# Patient Record
Sex: FEMALE | Race: WHITE | Employment: OTHER | URBAN - METROPOLITAN AREA
[De-identification: names, ages, dates, MRNs, and addresses within clinical notes are randomized per-mention and may not be internally consistent; named-entity substitution may affect disease eponyms.]

---

## 2022-09-17 ENCOUNTER — HOSPITAL ENCOUNTER (EMERGENCY)
Age: 77
Discharge: HOME | End: 2022-09-17
Attending: STUDENT IN AN ORGANIZED HEALTH CARE EDUCATION/TRAINING PROGRAM
Payer: MEDICARE

## 2022-09-17 VITALS
TEMPERATURE: 98.9 F | RESPIRATION RATE: 15 BRPM | HEART RATE: 76 BPM | SYSTOLIC BLOOD PRESSURE: 155 MMHG | OXYGEN SATURATION: 93 % | DIASTOLIC BLOOD PRESSURE: 70 MMHG
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
## 2022-09-17 DIAGNOSIS — R11.2 NON-INTRACTABLE VOMITING WITH NAUSEA, UNSPECIFIED VOMITING TYPE: Primary | ICD-10-CM

## 2022-09-17 LAB
A/G RATIO: 1.6 (ref 1.1–2.2)
ALBUMIN SERPL-MCNC: 3.6 G/DL (ref 3.4–5)
ALP BLD-CCNC: 52 U/L (ref 40–129)
ALT SERPL-CCNC: 16 U/L (ref 10–40)
ANION GAP SERPL CALCULATED.3IONS-SCNC: 10 MMOL/L (ref 3–16)
AST SERPL-CCNC: 17 U/L (ref 15–37)
BASOPHILS ABSOLUTE: 0 K/UL (ref 0–0.2)
BASOPHILS RELATIVE PERCENT: 0.4 %
BILIRUB SERPL-MCNC: 0.3 MG/DL (ref 0–1)
BUN BLDV-MCNC: 13 MG/DL (ref 7–20)
CALCIUM SERPL-MCNC: 7.9 MG/DL (ref 8.3–10.6)
CHLORIDE BLD-SCNC: 105 MMOL/L (ref 99–110)
CO2: 22 MMOL/L (ref 21–32)
CREAT SERPL-MCNC: <0.5 MG/DL (ref 0.6–1.2)
EOSINOPHILS ABSOLUTE: 0.1 K/UL (ref 0–0.6)
EOSINOPHILS RELATIVE PERCENT: 1.2 %
GFR AFRICAN AMERICAN: >60
GFR NON-AFRICAN AMERICAN: >60
GLUCOSE BLD-MCNC: 99 MG/DL (ref 70–99)
HCT VFR BLD CALC: 35.5 % (ref 36–48)
HEMOGLOBIN: 11.8 G/DL (ref 12–16)
LIPASE: 17 U/L (ref 13–60)
LYMPHOCYTES ABSOLUTE: 0.7 K/UL (ref 1–5.1)
LYMPHOCYTES RELATIVE PERCENT: 13.9 %
MCH RBC QN AUTO: 32.2 PG (ref 26–34)
MCHC RBC AUTO-ENTMCNC: 33.3 G/DL (ref 31–36)
MCV RBC AUTO: 96.7 FL (ref 80–100)
MONOCYTES ABSOLUTE: 0.4 K/UL (ref 0–1.3)
MONOCYTES RELATIVE PERCENT: 8.1 %
NEUTROPHILS ABSOLUTE: 4.1 K/UL (ref 1.7–7.7)
NEUTROPHILS RELATIVE PERCENT: 76.4 %
PDW BLD-RTO: 13.5 % (ref 12.4–15.4)
PLATELET # BLD: 292 K/UL (ref 135–450)
PMV BLD AUTO: 8.6 FL (ref 5–10.5)
POTASSIUM REFLEX MAGNESIUM: 3.8 MMOL/L (ref 3.5–5.1)
RBC # BLD: 3.67 M/UL (ref 4–5.2)
SODIUM BLD-SCNC: 137 MMOL/L (ref 136–145)
TOTAL PROTEIN: 5.9 G/DL (ref 6.4–8.2)
WBC # BLD: 5.4 K/UL (ref 4–11)

## 2022-09-17 PROCEDURE — 85025 COMPLETE CBC W/AUTO DIFF WBC: CPT

## 2022-09-17 PROCEDURE — 99284 EMERGENCY DEPT VISIT MOD MDM: CPT

## 2022-09-17 PROCEDURE — 80053 COMPREHEN METABOLIC PANEL: CPT

## 2022-09-17 PROCEDURE — 93005 ELECTROCARDIOGRAM TRACING: CPT | Performed by: STUDENT IN AN ORGANIZED HEALTH CARE EDUCATION/TRAINING PROGRAM

## 2022-09-17 PROCEDURE — 83690 ASSAY OF LIPASE: CPT

## 2022-09-17 ASSESSMENT — PAIN - FUNCTIONAL ASSESSMENT: PAIN_FUNCTIONAL_ASSESSMENT: NONE - DENIES PAIN

## 2022-09-17 NOTE — DISCHARGE INSTRUCTIONS
-You were brought into the emergency department by EMS after having a fall today  -You reported that you were also having nausea and vomiting this morning since before the fall  -Your EKG and electrolytes here were reassuring  -Please call your primary care doctor and follow-up with them within the next several days for repeat assessment of your symptoms  -Return to the ER for new or worsening symptoms including chest pain, trouble breathing, lightheadedness, headache, vision changes, recurrent fall or other symptoms that are concerning to you

## 2022-09-18 LAB
EKG ATRIAL RATE: 73 BPM
EKG DIAGNOSIS: NORMAL
EKG P AXIS: 51 DEGREES
EKG P-R INTERVAL: 158 MS
EKG Q-T INTERVAL: 420 MS
EKG QRS DURATION: 76 MS
EKG QTC CALCULATION (BAZETT): 462 MS
EKG R AXIS: 25 DEGREES
EKG T AXIS: 51 DEGREES
EKG VENTRICULAR RATE: 73 BPM

## 2022-09-20 ASSESSMENT — ENCOUNTER SYMPTOMS
DIARRHEA: 0
NAUSEA: 1
VOMITING: 1
SHORTNESS OF BREATH: 0
SORE THROAT: 0
COUGH: 0
ABDOMINAL PAIN: 0

## 2022-09-20 NOTE — ED PROVIDER NOTES
[address] [facility]          ATTENDING PHYSICIAN NOTE       Date of evaluation: 9/17/2022    Chief Complaint     Fall (From assisted living ( [facility] ); trip and  fall today denies LOC; n/v with squad) and Emesis (Iv zofran given in squad; n/v since resolved)      History of Present Illness     Alicia Small is a 68 y.o. female who presents by EMS after fall from facility. Patient reported that she was feeling nauseous earlier in the day and had several episodes of emesis. She reports that that she then had a mechanical ground-level fall and denies any loss of consciousness, hitting her head or presyncopal symptoms. EMS reports that patient had witnessed episode of emesis with them for which they gave her IV Zofran with improvement of her symptoms. Patient states that she intermittently has episodes of nausea and vomiting that resolved longer at home. Denies any associated fevers, abdominal pain, diarrhea, changes to appetite or other infectious symptoms. Patient denies any headaches, vision changes, neck pain, chest pain or trouble breathing. Denies any pain or injury from the fall. Review of Systems     Review of Systems   Constitutional:  Negative for chills and fever. HENT:  Negative for congestion and sore throat. Eyes:  Negative for visual disturbance. Respiratory:  Negative for cough and shortness of breath. Cardiovascular:  Negative for chest pain. Gastrointestinal:  Positive for nausea and vomiting. Negative for abdominal pain and diarrhea. Genitourinary:  Negative for dysuria and frequency. Musculoskeletal:  Negative for arthralgias, myalgias, neck pain and neck stiffness. Skin:  Negative for rash and wound. Neurological:  Negative for syncope and headaches. Psychiatric/Behavioral:  Negative for confusion. Past Medical, Surgical, Family, and Social History     She has no past medical history on file.   She has no past surgical history on file. Her family history is not on file. She reports that she has never smoked. She has never used smokeless tobacco. She reports that she does not currently use alcohol. Medications     There are no discharge medications for this patient. Allergies     She has No Known Allergies. Physical Exam     INITIAL VITALS: BP: (!) 155/70, Temp: 98.9 °F (37.2 °C), Heart Rate: 76, Resp: 15, SpO2: 93 %   Physical Exam  GENERAL: Awake, alert. No acute distress. Seated comfortably on stretcher  HEENT: Normocephalic, atraumatic. Conjunctiva clear, EOMI, no eye drainage. External ear normal.PERRL. Nares patent with no drainage. Mucous membranes moist. Neck is supple, with full ROM. No c-spine tenderness  CARDIOVASCULAR: RRR, extremities warm and appear well-perfused. No peripheral edema. Strong radial pulses bilaterally  RESPIRATORY: No increased WOB, good air movement, breath sounds CTAB, no wheezes, rhonchi, or crackles noted. Chest: clavicles stable, no chest wall tenderness  GI/ABDOMEN: Soft, non-distended, non-tender, normal bowel sounds, no rebound, no guarding. MSK/EXTR: No deformities or cyanosis noted. Normal ROM  BACK: no midline tenderness, no contusions  SKIN: Warm and dry, no rashes. NEURO: No focal neurologic deficits, moving all four extremities. No gross CN abnormalities. Sensation intact to light touch, ambulates with steady gait  PSYCH: Normal affect, answers questions appropriately. Diagnostic Results     EKG   EKG reviewed interpreted by me shows normal sinus rhythm with rate of 73, narrow QRS, normal axis, no ST elevations or depressions with T wave flattening in lead V2 and artifact in lead III with no prior for comparison.     RADIOLOGY:  No orders to display       LABS:   Results for orders placed or performed during the hospital encounter of 09/17/22   CMP w/ Reflex to MG   Result Value Ref Range    Sodium 137 136 - 145 mmol/L    Potassium reflex Magnesium 3.8 3.5 - 5.1 mmol/L Chloride 105 99 - 110 mmol/L    CO2 22 21 - 32 mmol/L    Anion Gap 10 3 - 16    Glucose 99 70 - 99 mg/dL    BUN 13 7 - 20 mg/dL    Creatinine <0.5 (L) 0.6 - 1.2 mg/dL    GFR Non-African American >60 >60    GFR African American >60 >60    Calcium 7.9 (L) 8.3 - 10.6 mg/dL    Total Protein 5.9 (L) 6.4 - 8.2 g/dL    Albumin 3.6 3.4 - 5.0 g/dL    Albumin/Globulin Ratio 1.6 1.1 - 2.2    Total Bilirubin 0.3 0.0 - 1.0 mg/dL    Alkaline Phosphatase 52 40 - 129 U/L    ALT 16 10 - 40 U/L    AST 17 15 - 37 U/L   CBC with Auto Differential   Result Value Ref Range    WBC 5.4 4.0 - 11.0 K/uL    RBC 3.67 (L) 4.00 - 5.20 M/uL    Hemoglobin 11.8 (L) 12.0 - 16.0 g/dL    Hematocrit 35.5 (L) 36.0 - 48.0 %    MCV 96.7 80.0 - 100.0 fL    MCH 32.2 26.0 - 34.0 pg    MCHC 33.3 31.0 - 36.0 g/dL    RDW 13.5 12.4 - 15.4 %    Platelets 000 604 - 822 K/uL    MPV 8.6 5.0 - 10.5 fL    Neutrophils % 76.4 %    Lymphocytes % 13.9 %    Monocytes % 8.1 %    Eosinophils % 1.2 %    Basophils % 0.4 %    Neutrophils Absolute 4.1 1.7 - 7.7 K/uL    Lymphocytes Absolute 0.7 (L) 1.0 - 5.1 K/uL    Monocytes Absolute 0.4 0.0 - 1.3 K/uL    Eosinophils Absolute 0.1 0.0 - 0.6 K/uL    Basophils Absolute 0.0 0.0 - 0.2 K/uL   Lipase   Result Value Ref Range    Lipase 17.0 13.0 - 60.0 U/L   EKG 12 Lead   Result Value Ref Range    Ventricular Rate 73 BPM    Atrial Rate 73 BPM    P-R Interval 158 ms    QRS Duration 76 ms    Q-T Interval 420 ms    QTc Calculation (Bazett) 462 ms    P Axis 51 degrees    R Axis 25 degrees    T Axis 51 degrees    Diagnosis       EKG performed in ER and to be interpreted by ER physician. Confirmed by MD, ER ([id number]),  [address] [place] [address] [phone]) on 9/18/2022 9:07:37 AM       ED BEDSIDE ULTRASOUND:  No results found.     RECENT VITALS:  BP: (!) 155/70,Temp: 98.9 °F (37.2 °C), Heart Rate: 76, Resp: 15, SpO2: 93 %     Procedures         ED Course     Nursing Notes, Past Medical Hx, Past Surgical Hx, Social Hx,Allergies, and Family Hx were reviewed. ED Course as of 09/20/22 1630   Sat Sep 17, 2022   1507 WBC: 5.4 [ED]   1507 Hemoglobin Quant(!): 11.8 [ED]   1519 Creatinine(!): <0.5 [ED]   1519 Potassium: 3.8 [ED]   1519 Sodium: 137 [ED]      ED Course User Index  [ED] Lissette Hermosillo MD       patient was given the following medications:  No orders of the defined types were placed in this encounter. CONSULTS:  None    MEDICAL DECISIONMAKING / ASSESSMENT / PLAN     Kiana Hernandez is a 68 y.o. female presenting by EMS for fall and vomiting. Patient is well-appearing in no acute distress. Differential concerning for but not limited to cardiac etiology, pancreatitis, hepatitis, cholecystitis, electrolyte abnormality. Patient reports mechanical ground-level fall with no syncope or presyncopal symptoms with no associated chest pain lightheadedness or trouble breathing. EKG nonischemic. Patient reports that she intermittently has episodes of nausea and vomiting that resolved on their own and states that this was present prior to her fall. Denies hitting her head, losing consciousness or being on blood thinners. Labs with normal white blood cell count, mild anemia to 11.8, normal lipase and LFTs. In the emergency department, patient stated that she felt well and wished to go back home. Patient was able to ambulate around the emergency department with steady gait without symptoms. Given the patient was well-appearing with stable vitals, reassuring exam and work-up, she was discharged home with recommendation to follow-up with her primary care doctor and to return to the ER for new or worsening symptoms. Patient expressed understanding and was amenable to plan. All questions were answered prior to discharge. Clinical Impression     1. Non-intractable vomiting with nausea, unspecified vomiting type    2.  Fall from ground level        Disposition     PATIENT REFERRED TO:  Dwaine Friar, MD [id number] [facility] [phone]    Call in 2 days  As needed, If symptoms worsen      DISCHARGE MEDICATIONS:  There are no discharge medications for this patient.       DISPOSITION Decision To Discharge 09/17/2022 03:55:03 PM        Vijay Chase MD  09/20/22 0706

## 2022-11-25 ENCOUNTER — HOSPITAL ENCOUNTER (EMERGENCY)
Age: 77
Discharge: HOME | End: 2022-11-25
Attending: EMERGENCY MEDICINE
Payer: MEDICARE

## 2022-11-25 ENCOUNTER — APPOINTMENT (OUTPATIENT)
Dept: CT IMAGING | Age: 77
End: 2022-11-25
Payer: MEDICARE

## 2022-11-25 ENCOUNTER — APPOINTMENT (OUTPATIENT)
Dept: GENERAL RADIOLOGY | Age: 77
End: 2022-11-25
Payer: MEDICARE

## 2022-11-25 VITALS
HEART RATE: 78 BPM | SYSTOLIC BLOOD PRESSURE: 146 MMHG | RESPIRATION RATE: 18 BRPM | DIASTOLIC BLOOD PRESSURE: 63 MMHG | TEMPERATURE: 98 F | OXYGEN SATURATION: 99 %

## 2022-11-25 DIAGNOSIS — N30.90 CYSTITIS: ICD-10-CM
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
## 2022-11-25 LAB
ANION GAP SERPL CALCULATED.3IONS-SCNC: 12 MMOL/L (ref 3–16)
BACTERIA: ABNORMAL /HPF
BASOPHILS ABSOLUTE: 0 K/UL (ref 0–0.2)
BASOPHILS RELATIVE PERCENT: 0.1 %
BILIRUBIN URINE: NEGATIVE
BLOOD, URINE: NEGATIVE
BUN BLDV-MCNC: 15 MG/DL (ref 7–20)
CALCIUM SERPL-MCNC: 9.4 MG/DL (ref 8.3–10.6)
CHLORIDE BLD-SCNC: 98 MMOL/L (ref 99–110)
CLARITY: CLEAR
CO2: 27 MMOL/L (ref 21–32)
COLOR: YELLOW
CREAT SERPL-MCNC: 0.6 MG/DL (ref 0.6–1.2)
EOSINOPHILS ABSOLUTE: 0 K/UL (ref 0–0.6)
EOSINOPHILS RELATIVE PERCENT: 0.6 %
EPITHELIAL CELLS, UA: ABNORMAL /HPF (ref 0–5)
GFR SERPL CREATININE-BSD FRML MDRD: >60 ML/MIN/{1.73_M2}
GLUCOSE BLD-MCNC: 130 MG/DL (ref 70–99)
GLUCOSE URINE: NEGATIVE MG/DL
HCT VFR BLD CALC: 41.2 % (ref 36–48)
HEMOGLOBIN: 13.7 G/DL (ref 12–16)
KETONES, URINE: ABNORMAL MG/DL
LEUKOCYTE ESTERASE, URINE: ABNORMAL
LYMPHOCYTES ABSOLUTE: 1.2 K/UL (ref 1–5.1)
LYMPHOCYTES RELATIVE PERCENT: 16.2 %
MCH RBC QN AUTO: 32.2 PG (ref 26–34)
MCHC RBC AUTO-ENTMCNC: 33.4 G/DL (ref 31–36)
MCV RBC AUTO: 96.4 FL (ref 80–100)
MICROSCOPIC EXAMINATION: YES
MONOCYTES ABSOLUTE: 0.6 K/UL (ref 0–1.3)
MONOCYTES RELATIVE PERCENT: 8.3 %
NEUTROPHILS ABSOLUTE: 5.3 K/UL (ref 1.7–7.7)
NEUTROPHILS RELATIVE PERCENT: 74.8 %
NITRITE, URINE: NEGATIVE
PDW BLD-RTO: 12.9 % (ref 12.4–15.4)
PH UA: 6.5 (ref 5–8)
PLATELET # BLD: 335 K/UL (ref 135–450)
PMV BLD AUTO: 8.6 FL (ref 5–10.5)
POTASSIUM REFLEX MAGNESIUM: 3.9 MMOL/L (ref 3.5–5.1)
PROTEIN UA: ABNORMAL MG/DL
RBC # BLD: 4.27 M/UL (ref 4–5.2)
RBC UA: ABNORMAL /HPF (ref 0–4)
SODIUM BLD-SCNC: 137 MMOL/L (ref 136–145)
SPECIFIC GRAVITY UA: 1.02 (ref 1–1.03)
TOTAL CK: 384 U/L (ref 26–192)
TROPONIN: <0.01 NG/ML
URINE REFLEX TO CULTURE: YES
URINE TYPE: ABNORMAL
UROBILINOGEN, URINE: 1 E.U./DL
WBC # BLD: 7.1 K/UL (ref 4–11)
WBC UA: ABNORMAL /HPF (ref 0–5)

## 2022-11-25 PROCEDURE — 2580000003 HC RX 258: Performed by: NURSE PRACTITIONER

## 2022-11-25 PROCEDURE — 87086 URINE CULTURE/COLONY COUNT: CPT

## 2022-11-25 PROCEDURE — 96360 HYDRATION IV INFUSION INIT: CPT

## 2022-11-25 PROCEDURE — 85025 COMPLETE CBC W/AUTO DIFF WBC: CPT

## 2022-11-25 PROCEDURE — 87186 SC STD MICRODIL/AGAR DIL: CPT

## 2022-11-25 PROCEDURE — 73562 X-RAY EXAM OF KNEE 3: CPT

## 2022-11-25 PROCEDURE — 51702 INSERT TEMP BLADDER CATH: CPT

## 2022-11-25 PROCEDURE — 84484 ASSAY OF TROPONIN QUANT: CPT

## 2022-11-25 PROCEDURE — 70450 CT HEAD/BRAIN W/O DYE: CPT

## 2022-11-25 PROCEDURE — 81001 URINALYSIS AUTO W/SCOPE: CPT

## 2022-11-25 PROCEDURE — 80048 BASIC METABOLIC PNL TOTAL CA: CPT

## 2022-11-25 PROCEDURE — 99284 EMERGENCY DEPT VISIT MOD MDM: CPT

## 2022-11-25 PROCEDURE — 82550 ASSAY OF CK (CPK): CPT

## 2022-11-25 PROCEDURE — 87088 URINE BACTERIA CULTURE: CPT

## 2022-11-25 PROCEDURE — 36415 COLL VENOUS BLD VENIPUNCTURE: CPT

## 2022-11-25 PROCEDURE — 6370000000 HC RX 637 (ALT 250 FOR IP): Performed by: PHYSICIAN ASSISTANT

## 2022-11-25 RX ORDER — 0.9 % SODIUM CHLORIDE 0.9 %
1000 INTRAVENOUS SOLUTION INTRAVENOUS ONCE
Status: COMPLETED | OUTPATIENT
Start: 2022-11-25 | End: 2022-11-25

## 2022-11-25 RX ORDER — 0.9 % SODIUM CHLORIDE 0.9 %
500 INTRAVENOUS SOLUTION INTRAVENOUS ONCE
Status: DISCONTINUED | OUTPATIENT
Start: 2022-11-25 | End: 2022-11-25

## 2022-11-25 RX ORDER — CEPHALEXIN 500 MG/1
500 CAPSULE ORAL ONCE
Status: COMPLETED | OUTPATIENT
Start: 2022-11-25 | End: 2022-11-25

## 2022-11-25 RX ORDER — CEPHALEXIN 500 MG/1
500 CAPSULE ORAL 4 TIMES DAILY
Qty: 28 CAPSULE | Refills: 0 | Status: SHIPPED | OUTPATIENT
Start: 2022-11-25 | End: 2022-12-02

## 2022-11-25 RX ADMIN — CEPHALEXIN 500 MG: 500 CAPSULE ORAL at 22:37

## 2022-11-25 RX ADMIN — SODIUM CHLORIDE 1000 ML: 0.9 INJECTION, SOLUTION INTRAVENOUS at 16:23

## 2022-11-25 ASSESSMENT — PAIN - FUNCTIONAL ASSESSMENT
PAIN_FUNCTIONAL_ASSESSMENT: NONE - DENIES PAIN
PAIN_FUNCTIONAL_ASSESSMENT: NONE - DENIES PAIN

## 2022-11-25 ASSESSMENT — ENCOUNTER SYMPTOMS
VOMITING: 1
RESPIRATORY NEGATIVE: 1
BLOOD IN STOOL: 0
BACK PAIN: 0
DIARRHEA: 1

## 2022-11-25 NOTE — ED NOTES
Ambulatory up to [facility] in room with staff assistance/supervision. Gait steady.      Collin Gomez RN  11/25/22 9282

## 2022-11-25 NOTE — ED NOTES
Pt resting quietly. Aware of need for UA, does not need to go currently. Arrived in urine/stool from lying on ground all night. Pt cleaned up. Alert and oriented. No acute complaints of pain.       Collin Gomez RN  11/25/22 5211

## 2022-11-25 NOTE — ED PROVIDER NOTES
ED Attending Attestation Note     Date of evaluation: 11/25/2022    This patient was seen by the advance practice provider. I have seen and examined the patient, agree with the workup, evaluation, management and diagnosis. The care plan has been discussed. My assessment reveals:  Physical Exam  Vitals and nursing note reviewed. Constitutional:       General: She is not in acute distress. Appearance: Normal appearance. She is not ill-appearing or toxic-appearing. HENT:      Head: Normocephalic and atraumatic. Mouth/Throat:      Mouth: Mucous membranes are moist.   Eyes:      Extraocular Movements: Extraocular movements intact. Pupils: Pupils are equal, round, and reactive to light. Cardiovascular:      Rate and Rhythm: Normal rate and regular rhythm. Pulmonary:      Effort: Pulmonary effort is normal.   Musculoskeletal:         General: No deformity. Cervical back: Normal range of motion. Comments: Bilateral hand deformities with missing digits   Skin:     General: Skin is warm and dry. Neurological:      Mental Status: She is alert. She describes a mechanical slip and fall last night. She was not able to reach her alert button, which was on the bookcase. She denies any injuries. She states that she will wear her alert button consistently moving forward.      Charna Prader, MD  11/25/22 2098

## 2022-11-25 NOTE — ED PROVIDER NOTES
[address] [facility] ENCOUNTER          NURSE PRACTITIONER NOTE       Date of evaluation: 11/25/2022    Chief Complaint     Fall (Tripped over blanket on ground and fell last night around 9pm, laid on ground all night. )      History of Present Illness     Elvia Milan is a 68 y.o. female past medical history of IBS, depression, prediabetes, obesity, hypertension, repeated falls; who presents to the emergency department with a complaint of a fall last night, and inability to get up off the ground. Patient reports that she tripped over a blanket that was on the ground when she was watching TV last night, and fell at approximately 9 PM.  She states that she has bad knees at baseline, was unable to get up due to her knee pain, and laid on the ground all night. She periodically slept, was able to show me over to her phone this morning when it was raining. States several people called her throughout the night however she is unable to get to her phone. All of her siblings live out of state, and states that one of them called for a well check on her due to her not answering the phone. On EMS arrival, patient noted to have vomited, and soiled herself with urine and stool, and she was unable to get up independently. Patient does state that she has a life alert button, however this was on a bookshelf out of reach. Patient did have some positional dizziness upon arrival here, otherwise denies dizziness when lying still, or any headaches. Denies feelings of nausea currently, abdominal pain, chest pain, shortness of breath, recent illnesses. States that she lives alone, is able to take care of herself at home independently, uses a walker when she is out and about, sometimes uses a cane at home. Review of Systems     Review of Systems   Constitutional:  Positive for fatigue. Respiratory: Negative. Cardiovascular: Negative. Gastrointestinal:  Positive for diarrhea and vomiting.  Negative for blood in stool. Musculoskeletal:  Positive for arthralgias (bilateral knee pain). Negative for back pain and neck pain. Skin: Negative. Allergic/Immunologic: Negative for immunocompromised state. Neurological:  Negative for dizziness, weakness, light-headedness, numbness and headaches. Psychiatric/Behavioral: Negative. Past Medical, Surgical, Family, and Social History     She has no past medical history on file. She has no past surgical history on file. Her family history is not on file. She reports that she has never smoked. She has never used smokeless tobacco. She reports that she does not currently use alcohol. Medications     Previous Medications    No medications on file       Allergies     She has No Known Allergies. Physical Exam     INITIAL VITALS: BP: (!) 155/55, Temp: 97.7 °F (36.5 °C), Heart Rate: 78, Resp: 19, SpO2: 98 %   Physical Exam  Vitals and nursing note reviewed. Constitutional:       Appearance: Normal appearance. HENT:      Head: Normocephalic and atraumatic. Cardiovascular:      Rate and Rhythm: Normal rate and regular rhythm. Pulses: Normal pulses. Heart sounds: Normal heart sounds. Abdominal:      General: Bowel sounds are normal. There is no distension. Palpations: Abdomen is soft. There is no mass. Tenderness: There is no abdominal tenderness. There is no guarding or rebound. Hernia: No hernia is present. Musculoskeletal:         General: Normal range of motion. Cervical back: Normal range of motion and neck supple. Comments: Able to fully range knees with pain, TTP to bilateral anterior knee joints without ligamentous instability. Mild bruising of the left anterior knee, no edema, warmth or erythema noted. Soft compartments throughout legs and arm. No TTP of pelvis   Skin:     General: Skin is warm and dry. Capillary Refill: Capillary refill takes less than 2 seconds.    Neurological:      Mental Status: She is alert and oriented to person, place, and time. Psychiatric:         Mood and Affect: Mood normal.         Behavior: Behavior normal.         Diagnostic Results     RADIOLOGY:  CT HEAD WO CONTRAST   Final Result   Impression:       No acute intracranial abnormality. XR KNEE RIGHT (3 VIEWS)   Final Result   Impression:       No acute osseous abnormality of either knee. Mild degenerative arthropathy of the right greater than left knee. XR KNEE LEFT (3 VIEWS)   Final Result   Impression:       No acute osseous abnormality of either knee. Mild degenerative arthropathy of the right greater than left knee.           LABS:   Results for orders placed or performed during the hospital encounter of 11/25/22   BMP w/ Reflex to MG   Result Value Ref Range    Sodium 137 136 - 145 mmol/L    Potassium reflex Magnesium 3.9 3.5 - 5.1 mmol/L    Chloride 98 (L) 99 - 110 mmol/L    CO2 27 21 - 32 mmol/L    Anion Gap 12 3 - 16    Glucose 130 (H) 70 - 99 mg/dL    BUN 15 7 - 20 mg/dL    Creatinine 0.6 0.6 - 1.2 mg/dL    Est, Glom Filt Rate >60 >60    Calcium 9.4 8.3 - 10.6 mg/dL   CBC with Auto Differential   Result Value Ref Range    WBC 7.1 4.0 - 11.0 K/uL    RBC 4.27 4.00 - 5.20 M/uL    Hemoglobin 13.7 12.0 - 16.0 g/dL    Hematocrit 41.2 36.0 - 48.0 %    MCV 96.4 80.0 - 100.0 fL    MCH 32.2 26.0 - 34.0 pg    MCHC 33.4 31.0 - 36.0 g/dL    RDW 12.9 12.4 - 15.4 %    Platelets 745 128 - 868 K/uL    MPV 8.6 5.0 - 10.5 fL    Neutrophils % 74.8 %    Lymphocytes % 16.2 %    Monocytes % 8.3 %    Eosinophils % 0.6 %    Basophils % 0.1 %    Neutrophils Absolute 5.3 1.7 - 7.7 K/uL    Lymphocytes Absolute 1.2 1.0 - 5.1 K/uL    Monocytes Absolute 0.6 0.0 - 1.3 K/uL    Eosinophils Absolute 0.0 0.0 - 0.6 K/uL    Basophils Absolute 0.0 0.0 - 0.2 K/uL   Troponin   Result Value Ref Range    Troponin <0.01 <0.01 ng/mL   CK   Result Value Ref Range    Total  (H) 26 - 192 U/L         RECENT VITALS:  BP: (!) 122/53, Temp: 97.7 °F (36.5 °C), Heart Rate: 75, Resp: 14, SpO2: 98 %       ED Course     Nursing Notes, Past Medical Hx, Past Surgical Hx, Social Hx, Allergies, and Family Hx were reviewed. The patient was given the following medications:  Orders Placed This Encounter   Medications    DISCONTD: 0.9 % sodium chloride bolus    0.9 % sodium chloride bolus            CONSULTS:  None    MEDICAL DECISION MAKING / ASSESSMENT / PLAN     Nazanin Sanches is a 68 y.o. female who presents with complaints as noted in HPI. Patient presents to the emergency department status post mechanical fall at home. Patient fell last evening at around 9 PM, states that she tripped on a throw blanket that was on the ground. Unfortunate was unable to get up due to ongoing pain in her knees which she states is typical for her. She tried several times, cannot reach her phone or get herself up off the ground so spent the night on the ground. Was able to get to her telephone this morning, and one of her family members called 911 to check on her. On arrival she was found to be soiled, and had emesis covering her as well as urine and stool. On my evaluation patient denies any presyncopal symptoms prior to her fall, states it was simply a tripping incident. She denies any current chest pain, shortness of breath, abdominal pain, nausea vomiting, headaches or vision changes. Did have some mild dizziness with position change here, denies any at rest.  She remembers the fall however cannot recall if she hit her head. Patient an IV placed the appropriate labwork was drawn including a CBC, BMP, troponin, CK, urinalysis. CK mildly elevated at 384, hydrated with a liter of IV fluids  BMP with a glucose of 130, BUN 15, creatinine 0.6  CBC without leukocytosis or anemia  Urinalysis pending    X-rays of patient's bilateral knees and head CT were obtained for further evaluation of injuries from the fall.   No acute osseous abnormality noted of either knee  Head CT without acute intracranial abnormality. Patient will be turned over primarily to my colleague for follow-up on urinalysis results, ambulation and ultimate disposition. This patient was also evaluated by the attending physician. All care plans were discussed and agreed upon. Clinical Impression     1. Fall, initial encounter    2.  Cystitis        Disposition       DISPOSITION  pending        JOSE ALFREDO Orta - CNP  11/26/22 1110

## 2022-11-26 NOTE — ED NOTES
Pt discharged to home. Discharge instructions including 1 prescription reviewed with patient. All questions answered, no concerns noted. Pt encouraged to return to emergency department if they have any new or worsening symptoms. Pt alert and oriented, resp even and unlabored, skin natural in color, no signs of acute distress.           Ирина Martinez RN  11/25/22 5067

## 2022-11-26 NOTE — ED PROVIDER NOTES
ADDENDUM:      Care of this patient was assumed from [facility]. The patient was seen for Fall (Tripped over blanket on ground and fell last night around 9pm, laid on ground all night. )  . The patient's initial evaluation and plan have been discussed with the prior provider who initially evaluated the patient. Nursing Notes, Past Medical Hx, Past Surgical Hx, Social Hx, Allergies, and Family Hx were all reviewed. Diagnostic Results     EKG   Not performed    RADIOLOGY:  CT HEAD WO CONTRAST   Final Result   Impression:       No acute intracranial abnormality. XR KNEE RIGHT (3 VIEWS)   Final Result   Impression:       No acute osseous abnormality of either knee. Mild degenerative arthropathy of the right greater than left knee. XR KNEE LEFT (3 VIEWS)   Final Result   Impression:       No acute osseous abnormality of either knee. Mild degenerative arthropathy of the right greater than left knee. LABS:   Labs Reviewed   BASIC METABOLIC PANEL W/ REFLEX TO MG FOR LOW K - Abnormal; Notable for the following components:       Result Value    Chloride 98 (*)     Glucose 130 (*)     All other components within normal limits   CK - Abnormal; Notable for the following components: Total  (*)     All other components within normal limits   CBC WITH AUTO DIFFERENTIAL   TROPONIN   URINALYSIS WITH REFLEX TO CULTURE       RECENT VITALS:  BP: (!) 129/51, Temp: 97.7 °F (36.5 °C), Heart Rate: 76, Resp: 15     Procedures       ED Course     The patient was given the following medications:  Orders Placed This Encounter   Medications    DISCONTD: 0.9 % sodium chloride bolus    0.9 % sodium chloride bolus         CONSULTS:  None    MEDICAL DECISION MAKING     Chuy Wright is a 68 y.o. female who presented after a fall. She had a medical screening workup with imaging performed and she was turned over to me pending a uA and ambulation test.  The patient ambulated without difficulty.   Was unable to provide us with a urine sample and after multiple requests by staff she decided that she would like to leave stating that she can urinate when she gets home and she does not have to go right now and does not want a wait. I explained to her that we want to perform a urinalysis to check for an occult urinary tract infection that may explain her fall or weakness. She denies dysuria hematuria foul odor or discharge, pelvic pain, fevers chills, confusion. She had understands that we want to check the urine for the specific reasons but she has no concerns that she has an active UTI. She was comfortable being discharged without knowing that information and we encouraged her to follow-up with primary care. Prior to discharge she was agreeable to giving us a urine sample, which may show a mild UTI. Will send for cx, and start Keflex. Patient was able to prove she could ambulate again, and seems appropriate for discharge. Will continue the plan as above. This patient was also evaluated by the attending physician. All care plans were discussed and agreed upon. Clinical Impression     1. Fall, initial encounter        Disposition/Plan     PATIENT REFERRED TO:  MD [id number]  [address]  [phone]    Call   For ED follow up appointment      DISCHARGE MEDICATIONS:  New Prescriptions    No medications on file       DISPOSITION Decision To Discharge 11/25/2022 08:17:07 PM      (Please note that portions of this note were completed with voice recognition software.   Efforts were made to edit the dictations but occasionally words are mis-transcribed.)         Mindy Carbone, [id number]  11/25/22 1411 Lasha Kohler, [address]  11/25/22 8727

## 2022-11-26 NOTE — ED NOTES
Patient not able to void using purewick, bedpan or BSC. Bladder scan showing 267ml. Pt states she does not wish to be cathed for urine. Explained that UA is only missing piece of ER work up. Pt respectfully wishes to not have catheter for urine sample. Relayed to maria esther Garcia RN  11/25/22 3090

## 2022-11-28 LAB
ORGANISM: ABNORMAL
URINE CULTURE, ROUTINE: ABNORMAL

## 2024-04-20 ENCOUNTER — APPOINTMENT (OUTPATIENT)
Dept: CT IMAGING | Age: 79
End: 2024-04-20
Payer: MEDICARE

## 2024-04-20 ENCOUNTER — APPOINTMENT (OUTPATIENT)
Dept: GENERAL RADIOLOGY | Age: 79
End: 2024-04-20
Payer: MEDICARE

## 2024-04-20 ENCOUNTER — HOSPITAL ENCOUNTER (EMERGENCY)
Age: 79
Discharge: HOME | End: 2024-04-20
Attending: STUDENT IN AN ORGANIZED HEALTH CARE EDUCATION/TRAINING PROGRAM
Payer: MEDICARE

## 2024-04-20 VITALS
RESPIRATION RATE: 21 BRPM | DIASTOLIC BLOOD PRESSURE: 78 MMHG | SYSTOLIC BLOOD PRESSURE: 109 MMHG | WEIGHT: 189.3 LBS | HEART RATE: 73 BPM | OXYGEN SATURATION: 94 %
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
## 2024-04-20 DIAGNOSIS — S01.511A LIP LACERATION, INITIAL ENCOUNTER: ICD-10-CM

## 2024-04-20 PROCEDURE — 70450 CT HEAD/BRAIN W/O DYE: CPT

## 2024-04-20 PROCEDURE — 12011 RPR F/E/E/N/L/M 2.5 CM/<: CPT

## 2024-04-20 PROCEDURE — 70486 CT MAXILLOFACIAL W/O DYE: CPT

## 2024-04-20 PROCEDURE — 99284 EMERGENCY DEPT VISIT MOD MDM: CPT

## 2024-04-20 PROCEDURE — 72125 CT NECK SPINE W/O DYE: CPT

## 2024-04-20 PROCEDURE — 73560 X-RAY EXAM OF KNEE 1 OR 2: CPT

## 2024-04-20 PROCEDURE — 2500000003 HC RX 250 WO HCPCS

## 2024-04-20 RX ORDER — LIDOCAINE HYDROCHLORIDE AND EPINEPHRINE 10; 10 MG/ML; UG/ML
20 INJECTION, SOLUTION INFILTRATION; PERINEURAL ONCE
Status: COMPLETED | OUTPATIENT
Start: 2024-04-20 | End: 2024-04-20

## 2024-04-20 RX ADMIN — LIDOCAINE HYDROCHLORIDE,EPINEPHRINE BITARTRATE 20 ML: 10; .01 INJECTION, SOLUTION INFILTRATION; PERINEURAL at 13:23

## 2024-04-20 ASSESSMENT — PAIN - FUNCTIONAL ASSESSMENT: PAIN_FUNCTIONAL_ASSESSMENT: NONE - DENIES PAIN

## 2024-04-20 NOTE — ED PROVIDER NOTES
ED Attending Attestation Note     Date of evaluation: 4/20/2024    I have discussed the case with the resident. I have personally performed a history, physical exam, and my own medical decision making. I have reviewed the note and agree with the findings and plan. My assessment reveals a 78-year-old female with facial trauma after mechanical fall.  CT scan of the head, cervical spine, and facial bones were performed and did not show any acute bony or intracranial abnormality.  The patient's lip laceration was repaired, please see procedure note for further details.  I was present for all critical portions of the procedure performed by the resident.  Patient discharged home with strict return precautions and instruction to follow-up with her PCP in the coming 3 days.       Kt Jacobs MD  04/20/24 5928

## 2024-04-20 NOTE — ED PROVIDER NOTES
THE [facility]  EMERGENCY DEPARTMENT ENCOUNTER          EM RESIDENT NOTE       Date of evaluation: 4/20/2024    Chief Complaint     Fall      History of Present Illness     Dalila Eddy is a 78 y.o. female who presents s/p fall.    Patient states she was walking down an incline to her car when she had a mechanical fall.  She states that she tripped going down the incline.  Denies any preceding symptoms including chest pain, dizziness, palpitations.  No LOC, patient is not on anticoagulation.  Patient denies any current pain although has visible trauma with dried blood to her face.  Denies chest pain, shortness of breath, abdominal pain, blurred vision, N/V, headache.  Tetanus up-to-date.    MEDICAL DECISION MAKING / ASSESSMENT / PLAN     INITIAL VITALS: BP: (!) 146/61,  , Pulse: 73, Respirations: 21, SpO2: 94 %    Dalila Edyd is a 78 y.o. female presenting after a mechanical fall.      On presentation, patient is resting comfortably and in no acute distress.  Vital signs are unremarkable, patient SBP is in the 140s, she is nontachycardic, she is satting mid 90s on room air with no increased work of breathing.  On exam, patient has dried blood to the right side of her face with a 1 cm laceration to her right lower lip that does not cross the milium border, as well as swelling to her right upper lip.  There is also an abrasion above her right eyebrow.  She has no trismus or malocclusion.  No C/T/L-spine tenderness, however will maintain spinal precautions with a c-collar in place.  She has an abrasion to her right knee with some tenderness to palpation, however patient states that she has arthritis in this knee and that this pain is not new.  However, will obtain x-ray of the right knee to further evaluate.  Will also obtain CT of the head, C-spine, as well as facial bones.  Patient is at greater risk of injury given her age and has visible trauma to her face.  No concerning abdominal findings that would

## 2024-04-20 NOTE — DISCHARGE INSTRUCTIONS
You were seen in the [facility] emergency department after a fall.  You had a large cut on your lower lip that was repaired with absorbable stitches.  The stitches do not need to be removed and will fall out on their own.  Please return to the emergency department for any new or worsening symptoms or if you feel that you need to be evaluated by a physician.

## 2024-11-16 ENCOUNTER — APPOINTMENT (OUTPATIENT)
Dept: CT IMAGING | Age: 79
DRG: 948 | End: 2024-11-16
Payer: COMMERCIAL

## 2024-11-16 ENCOUNTER — HOSPITAL ENCOUNTER (INPATIENT)
Age: 79
LOS: 1 days | Discharge: HOME HEALTH | DRG: 948 | End: 2024-11-18
Attending: STUDENT IN AN ORGANIZED HEALTH CARE EDUCATION/TRAINING PROGRAM | Admitting: INTERNAL MEDICINE
Payer: COMMERCIAL

## 2024-11-16 DIAGNOSIS — R29.6 MULTIPLE FALLS: ICD-10-CM

## 2024-11-16 DIAGNOSIS — R74.8 ELEVATED CK: Primary | ICD-10-CM

## 2024-11-16 LAB
ALBUMIN SERPL-MCNC: 4.2 G/DL (ref 3.4–5)
ALBUMIN/GLOB SERPL: 1.3 {RATIO} (ref 1.1–2.2)
ALP SERPL-CCNC: 55 U/L (ref 40–129)
ALT SERPL-CCNC: 30 U/L (ref 10–40)
ANION GAP SERPL CALCULATED.3IONS-SCNC: 13 MMOL/L (ref 3–16)
AST SERPL-CCNC: 60 U/L (ref 15–37)
BASOPHILS # BLD: 0 K/UL (ref 0–0.2)
BASOPHILS NFR BLD: 0.2 %
BILIRUB SERPL-MCNC: 0.6 MG/DL (ref 0–1)
BUN SERPL-MCNC: 17 MG/DL (ref 7–20)
CALCIUM SERPL-MCNC: 8.6 MG/DL (ref 8.3–10.6)
CHLORIDE SERPL-SCNC: 100 MMOL/L (ref 99–110)
CK SERPL-CCNC: 1432 U/L (ref 26–192)
CO2 SERPL-SCNC: 24 MMOL/L (ref 21–32)
CREAT SERPL-MCNC: 0.7 MG/DL (ref 0.6–1.2)
DEPRECATED RDW RBC AUTO: 13.1 % (ref 12.4–15.4)
EOSINOPHIL # BLD: 0.5 K/UL (ref 0–0.6)
EOSINOPHIL NFR BLD: 6 %
GFR SERPLBLD CREATININE-BSD FMLA CKD-EPI: 88 ML/MIN/{1.73_M2}
GLUCOSE SERPL-MCNC: 110 MG/DL (ref 70–99)
HCT VFR BLD AUTO: 35.9 % (ref 36–48)
HGB BLD-MCNC: 12.2 G/DL (ref 12–16)
LYMPHOCYTES # BLD: 1.2 K/UL (ref 1–5.1)
LYMPHOCYTES NFR BLD: 13.1 %
MCH RBC QN AUTO: 32.2 PG (ref 26–34)
MCHC RBC AUTO-ENTMCNC: 34 G/DL (ref 31–36)
MCV RBC AUTO: 94.8 FL (ref 80–100)
MONOCYTES # BLD: 0.7 K/UL (ref 0–1.3)
MONOCYTES NFR BLD: 8.1 %
NEUTROPHILS # BLD: 6.5 K/UL (ref 1.7–7.7)
NEUTROPHILS NFR BLD: 72.6 %
PLATELET # BLD AUTO: 339 K/UL (ref 135–450)
PMV BLD AUTO: 8.5 FL (ref 5–10.5)
POTASSIUM SERPL-SCNC: 3.8 MMOL/L (ref 3.5–5.1)
PROT SERPL-MCNC: 7.4 G/DL (ref 6.4–8.2)
RBC # BLD AUTO: 3.79 M/UL (ref 4–5.2)
SODIUM SERPL-SCNC: 137 MMOL/L (ref 136–145)
WBC # BLD AUTO: 9 K/UL (ref 4–11)

## 2024-11-16 PROCEDURE — 6360000004 HC RX CONTRAST MEDICATION

## 2024-11-16 PROCEDURE — 93005 ELECTROCARDIOGRAM TRACING: CPT

## 2024-11-16 PROCEDURE — 74177 CT ABD & PELVIS W/CONTRAST: CPT

## 2024-11-16 PROCEDURE — 71260 CT THORAX DX C+: CPT

## 2024-11-16 PROCEDURE — 76376 3D RENDER W/INTRP POSTPROCES: CPT

## 2024-11-16 PROCEDURE — 72125 CT NECK SPINE W/O DYE: CPT

## 2024-11-16 PROCEDURE — 70450 CT HEAD/BRAIN W/O DYE: CPT

## 2024-11-16 PROCEDURE — 99285 EMERGENCY DEPT VISIT HI MDM: CPT

## 2024-11-16 PROCEDURE — 82550 ASSAY OF CK (CPK): CPT

## 2024-11-16 PROCEDURE — 80053 COMPREHEN METABOLIC PANEL: CPT

## 2024-11-16 PROCEDURE — 85025 COMPLETE CBC W/AUTO DIFF WBC: CPT

## 2024-11-16 RX ORDER — IOPAMIDOL 755 MG/ML
75 INJECTION, SOLUTION INTRAVASCULAR
Status: COMPLETED | OUTPATIENT
Start: 2024-11-16 | End: 2024-11-16

## 2024-11-16 RX ADMIN — IOPAMIDOL 75 ML: 755 INJECTION, SOLUTION INTRAVENOUS at 23:30

## 2024-11-16 ASSESSMENT — PAIN SCALES - GENERAL: PAINLEVEL_OUTOF10: 8

## 2024-11-16 ASSESSMENT — LIFESTYLE VARIABLES
HOW OFTEN DO YOU HAVE A DRINK CONTAINING ALCOHOL: NEVER
HOW MANY STANDARD DRINKS CONTAINING ALCOHOL DO YOU HAVE ON A TYPICAL DAY: PATIENT DOES NOT DRINK
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
## 2024-11-17 LAB
ANION GAP SERPL CALCULATED.3IONS-SCNC: 12 MMOL/L (ref 3–16)
BILIRUB UR QL STRIP.AUTO: ABNORMAL
BUN SERPL-MCNC: 18 MG/DL (ref 7–20)
CALCIUM SERPL-MCNC: 8.2 MG/DL (ref 8.3–10.6)
CHLORIDE SERPL-SCNC: 103 MMOL/L (ref 99–110)
CK SERPL-CCNC: 1047 U/L (ref 26–192)
CK SERPL-CCNC: 702 U/L (ref 26–192)
CLARITY UR: CLEAR
CO2 SERPL-SCNC: 22 MMOL/L (ref 21–32)
COLOR UR: YELLOW
CREAT SERPL-MCNC: 0.7 MG/DL (ref 0.6–1.2)
GFR SERPLBLD CREATININE-BSD FMLA CKD-EPI: 88 ML/MIN/{1.73_M2}
GLUCOSE SERPL-MCNC: 113 MG/DL (ref 70–99)
GLUCOSE UR STRIP.AUTO-MCNC: NEGATIVE MG/DL
HGB UR QL STRIP.AUTO: NEGATIVE
KETONES UR STRIP.AUTO-MCNC: 15 MG/DL
LEUKOCYTE ESTERASE UR QL STRIP.AUTO: NEGATIVE
NITRITE UR QL STRIP.AUTO: NEGATIVE
PH UR STRIP.AUTO: 6 [PH] (ref 5–8)
POTASSIUM SERPL-SCNC: 3.8 MMOL/L (ref 3.5–5.1)
PROT UR STRIP.AUTO-MCNC: ABNORMAL MG/DL
RBC #/AREA URNS HPF: NORMAL /HPF (ref 0–4)
SODIUM SERPL-SCNC: 137 MMOL/L (ref 136–145)
SP GR UR STRIP.AUTO: 1.02 (ref 1–1.03)
UA COMPLETE W REFLEX CULTURE PNL UR: ABNORMAL
UA DIPSTICK W REFLEX MICRO PNL UR: YES
URN SPEC COLLECT METH UR: ABNORMAL
UROBILINOGEN UR STRIP-ACNC: 0.2 E.U./DL
WBC #/AREA URNS HPF: NORMAL /HPF (ref 0–5)

## 2024-11-17 PROCEDURE — 81001 URINALYSIS AUTO W/SCOPE: CPT

## 2024-11-17 PROCEDURE — 36415 COLL VENOUS BLD VENIPUNCTURE: CPT

## 2024-11-17 PROCEDURE — 2580000003 HC RX 258

## 2024-11-17 PROCEDURE — 51701 INSERT BLADDER CATHETER: CPT

## 2024-11-17 PROCEDURE — G0378 HOSPITAL OBSERVATION PER HR: HCPCS

## 2024-11-17 PROCEDURE — 51798 US URINE CAPACITY MEASURE: CPT

## 2024-11-17 PROCEDURE — 2580000003 HC RX 258: Performed by: INTERNAL MEDICINE

## 2024-11-17 PROCEDURE — 1200000000 HC SEMI PRIVATE

## 2024-11-17 PROCEDURE — 96360 HYDRATION IV INFUSION INIT: CPT

## 2024-11-17 PROCEDURE — 82550 ASSAY OF CK (CPK): CPT

## 2024-11-17 PROCEDURE — 6370000000 HC RX 637 (ALT 250 FOR IP): Performed by: INTERNAL MEDICINE

## 2024-11-17 PROCEDURE — 80048 BASIC METABOLIC PNL TOTAL CA: CPT

## 2024-11-17 RX ORDER — SODIUM CHLORIDE 9 MG/ML
INJECTION, SOLUTION INTRAVENOUS PRN
Status: DISCONTINUED | OUTPATIENT
Start: 2024-11-17 | End: 2024-11-18 | Stop reason: HOSPADM

## 2024-11-17 RX ORDER — BUPROPION HYDROCHLORIDE 150 MG/1
150 TABLET ORAL EVERY MORNING
COMMUNITY

## 2024-11-17 RX ORDER — SODIUM CHLORIDE 0.9 % (FLUSH) 0.9 %
5-40 SYRINGE (ML) INJECTION EVERY 12 HOURS SCHEDULED
Status: DISCONTINUED | OUTPATIENT
Start: 2024-11-17 | End: 2024-11-18 | Stop reason: HOSPADM

## 2024-11-17 RX ORDER — ACETAMINOPHEN 325 MG/1
650 TABLET ORAL EVERY 6 HOURS PRN
Status: DISCONTINUED | OUTPATIENT
Start: 2024-11-17 | End: 2024-11-18 | Stop reason: HOSPADM

## 2024-11-17 RX ORDER — ACETAMINOPHEN 325 MG/1
650 TABLET ORAL EVERY 6 HOURS PRN
COMMUNITY

## 2024-11-17 RX ORDER — ONDANSETRON 2 MG/ML
4 INJECTION INTRAMUSCULAR; INTRAVENOUS EVERY 6 HOURS PRN
Status: DISCONTINUED | OUTPATIENT
Start: 2024-11-17 | End: 2024-11-18 | Stop reason: HOSPADM

## 2024-11-17 RX ORDER — ALBUTEROL SULFATE 90 UG/1
2 INHALANT RESPIRATORY (INHALATION) EVERY 6 HOURS PRN
COMMUNITY

## 2024-11-17 RX ORDER — FLUOXETINE 40 MG/1
40 CAPSULE ORAL DAILY
COMMUNITY

## 2024-11-17 RX ORDER — OXYCODONE HYDROCHLORIDE 5 MG/1
5 TABLET ORAL EVERY 4 HOURS PRN
Status: DISCONTINUED | OUTPATIENT
Start: 2024-11-17 | End: 2024-11-18 | Stop reason: HOSPADM

## 2024-11-17 RX ORDER — 0.9 % SODIUM CHLORIDE 0.9 %
1000 INTRAVENOUS SOLUTION INTRAVENOUS ONCE
Status: COMPLETED | OUTPATIENT
Start: 2024-11-17 | End: 2024-11-17

## 2024-11-17 RX ORDER — SODIUM CHLORIDE 0.9 % (FLUSH) 0.9 %
5-40 SYRINGE (ML) INJECTION PRN
Status: DISCONTINUED | OUTPATIENT
Start: 2024-11-17 | End: 2024-11-18 | Stop reason: HOSPADM

## 2024-11-17 RX ORDER — METFORMIN HYDROCHLORIDE 500 MG/1
500 TABLET, EXTENDED RELEASE ORAL
COMMUNITY

## 2024-11-17 RX ORDER — ACETAMINOPHEN 650 MG/1
650 SUPPOSITORY RECTAL EVERY 6 HOURS PRN
Status: DISCONTINUED | OUTPATIENT
Start: 2024-11-17 | End: 2024-11-18 | Stop reason: HOSPADM

## 2024-11-17 RX ORDER — MAGNESIUM SULFATE IN WATER 40 MG/ML
2000 INJECTION, SOLUTION INTRAVENOUS PRN
Status: DISCONTINUED | OUTPATIENT
Start: 2024-11-17 | End: 2024-11-18 | Stop reason: HOSPADM

## 2024-11-17 RX ORDER — SODIUM CHLORIDE 9 MG/ML
INJECTION, SOLUTION INTRAVENOUS CONTINUOUS
Status: ACTIVE | OUTPATIENT
Start: 2024-11-17 | End: 2024-11-18

## 2024-11-17 RX ORDER — POLYETHYLENE GLYCOL 3350 17 G/17G
17 POWDER, FOR SOLUTION ORAL DAILY PRN
Status: DISCONTINUED | OUTPATIENT
Start: 2024-11-17 | End: 2024-11-18 | Stop reason: HOSPADM

## 2024-11-17 RX ORDER — ACETAMINOPHEN 160 MG
2000 TABLET,DISINTEGRATING ORAL DAILY
COMMUNITY

## 2024-11-17 RX ORDER — ONDANSETRON 4 MG/1
4 TABLET, ORALLY DISINTEGRATING ORAL EVERY 8 HOURS PRN
Status: DISCONTINUED | OUTPATIENT
Start: 2024-11-17 | End: 2024-11-18 | Stop reason: HOSPADM

## 2024-11-17 RX ORDER — POTASSIUM CHLORIDE 7.45 MG/ML
10 INJECTION INTRAVENOUS PRN
Status: DISCONTINUED | OUTPATIENT
Start: 2024-11-17 | End: 2024-11-18 | Stop reason: HOSPADM

## 2024-11-17 RX ORDER — AMLODIPINE BESYLATE 5 MG/1
5 TABLET ORAL DAILY
COMMUNITY

## 2024-11-17 RX ORDER — POTASSIUM CHLORIDE 1500 MG/1
40 TABLET, EXTENDED RELEASE ORAL PRN
Status: DISCONTINUED | OUTPATIENT
Start: 2024-11-17 | End: 2024-11-18 | Stop reason: HOSPADM

## 2024-11-17 RX ORDER — OXYCODONE HYDROCHLORIDE 5 MG/1
10 TABLET ORAL EVERY 4 HOURS PRN
Status: DISCONTINUED | OUTPATIENT
Start: 2024-11-17 | End: 2024-11-18 | Stop reason: HOSPADM

## 2024-11-17 RX ORDER — FAMOTIDINE 20 MG/1
20 TABLET, FILM COATED ORAL 2 TIMES DAILY PRN
COMMUNITY

## 2024-11-17 RX ADMIN — SODIUM CHLORIDE 1000 ML: 0.9 INJECTION, SOLUTION INTRAVENOUS at 03:00

## 2024-11-17 RX ADMIN — POLYETHYLENE GLYCOL 3350 17 G: 17 POWDER, FOR SOLUTION ORAL at 10:35

## 2024-11-17 RX ADMIN — SODIUM CHLORIDE: 9 INJECTION, SOLUTION INTRAVENOUS at 03:55

## 2024-11-17 RX ADMIN — SODIUM CHLORIDE: 9 INJECTION, SOLUTION INTRAVENOUS at 13:50

## 2024-11-17 RX ADMIN — SODIUM CHLORIDE, PRESERVATIVE FREE 10 ML: 5 INJECTION INTRAVENOUS at 19:48

## 2024-11-17 ASSESSMENT — PAIN SCALES - GENERAL
PAINLEVEL_OUTOF10: 0
PAINLEVEL_OUTOF10: 0

## 2024-11-17 NOTE — H&P
V2.0  History and Physical      Name:  Dalila Eddy /Age/Sex: 1945  (78 y.o. female)   MRN & CSN:  [id number] & [id number] Encounter Date/Time: 2024 4:03 AM EST   Location:  [facility] PCP: Debra Dias MD       Hospital Day: 2    Assessment and Plan:   Dalila Eddy is a 78 y.o. 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   No # on imaging.     2. We dont have a list of her home meds- consult pharmacy.     3. Dental caries POA- please see oral surgeon as OP.     Hospital Problems             Last Modified POA    * (Principal) Fall (on) (from) other stairs and steps, initial encounter 2024 Yes       Disposition:   Current Living situation: home  Expected Disposition: rehab  Estimated D/C: TBD    Diet ADULT DIET; Regular   DVT Prophylaxis [] Lovenox, []  Heparin, [x] SCDs, [] Ambulation,  [] Eliquis, [] Xarelto, [] Coumadin   Code Status Full Code   Surrogate Decision Maker/ POA      History from:     patient    History of Present Illness:     Chief Complaint: fall    Dalila Eddy is a 78 y.o. female with past medical history of hypertension, prediabetes, frequent falls, and stress incontinence who presents to the ED with tailbone pain status post fall.  Patient notes that she developed tailbone pain approximately 2 days ago when she was sitting on the toilet.  She notes that she was on the toilet for a few hours and was unable to get up so she had to call EMS to assist.  She does note that yesterday, she slid on the floor and fell onto her  and feet  Severe dental caries    General: NAD  Eyes: EOMI  ENT: neck supple  Cardiovascular: Regular rate.  Respiratory: Clear to auscultation  Gastrointestinal: Soft, non tender  Genitourinary: no suprapubic tenderness  Musculoskeletal: No edema  Skin: warm, dry  Neuro: Alert.  Psych: Mood appropriate.       Past Medical History:   PMHx   Past Medical History:   Diagnosis Date    Acute cystitis     Adjustment disorder with depressed mood     At high risk for falls     Balance disorder     Bronchitis     Congenital deformity of both feet     Congenital deformity of both hands     Depression     Dysuria     Elevated TSH     Frequent falls     Hypertension     IBS (irritable bowel syndrome)     Morbid obesity     Osteoarthritis     Overactive bladder     Prediabetes     Repeated falls     Stress incontinence     Trigeminal nerve disorder     Vertigo     Vitamin D deficiency      PSHX:  has no past surgical history on file.  Allergies: No Known Allergies  Fam HX:  family history is not on file.  Soc HX:   Social History     Socioeconomic History    Marital status: Single     Spouse name: None    Number of children: None    Years of education: None    Highest education level: None   Tobacco Use    Smoking status: Never    Smokeless tobacco: Never   Substance and Sexual Activity    Alcohol use: Not Currently    Drug use: Never    Sexual activity: Never     Social Determinants of Health     Food Insecurity: Not At Risk (1/16/2024)    Received from [employer] [email or web address] [facility]    Food Insecurities     Within the past 12 months, did you worry that your food would run out before you got money to buy more?: No     Within the past 12 months, did the food you bought just not last and you didn't have money to get more?: No   Transportation Needs: Not At Risk (1/16/2024)    Received from [employer] [email or web address] [facility]    Transportation     Within the past 12 months, has a lack of transportation kept you

## 2024-11-17 NOTE — ED NOTES
Pt refused vitals. Pt educated on the important's of frequent vitals and still declined. Pt said she will do them periodically.      Tootie Landry, RN  11/17/24 7817

## 2024-11-17 NOTE — PLAN OF CARE
[facility] [place]  Consult received.  Case reviewed with ER physician- fall and rhabdo    Full note to follow.    Debra Dais MD    Thanks  MARY HONEYCUTT MD

## 2024-11-17 NOTE — PLAN OF CARE
Problem: Safety - Adult  Goal: Free from fall injury  11/17/2024 0750 by Cy Kumar RN  Outcome: Progressing  Flowsheets (Taken 11/17/2024 0750)  Free From Fall Injury:   Instruct family/caregiver on patient safety   Based on caregiver fall risk screen, instruct family/caregiver to ask for assistance with transferring infant if caregiver noted to have fall risk factors  Note: Due to multiple falls prior to admission and lower extremity weakness, patient has been deemed a high fall risk. Bed alarm is engaged, bed is in lowest position, room is free of clutter, and call light is within reach of patient. Education provided on calling when assistance is needed. Patient verbalized understanding at this time.     Problem: Skin/Tissue Integrity  Goal: Absence of new skin breakdown  Description: 1.  Monitor for areas of redness and/or skin breakdown  2.  Assess vascular access sites hourly  3.  Every 4-6 hours minimum:  Change oxygen saturation probe site  4.  Every 4-6 hours:  If on nasal continuous positive airway pressure, respiratory therapy assess nares and determine need for appliance change or resting period.  Outcome: Progressing  Note: Patient has redness noted to adriano-area. Cream applied and Inter-Dry applied to abdominal folds for added protection from excoriation. Patient to be turned and repositioned at least every 2 hours to alleviate pressure and prevent skin breakdown.

## 2024-11-17 NOTE — PROGRESS NOTES
4 Eyes Skin Assessment     NAME:  Dalila Eddy  YOB: 1945  MEDICAL RECORD NUMBER:  [id number]    The patient is being assessed for  Admission    I agree that at least one RN has performed a thorough Head to Toe Skin Assessment on the patient. ALL assessment sites listed below have been assessed.      Areas assessed by both nurses:    Head, Face, Ears, Shoulders, Back, Chest, Arms, Elbows, Hands, Sacrum. Buttock, Coccyx, Ischium, Legs. Feet and Heels, and Under Medical Devices   Patient missing toes and fingers.         Does the Patient have a Wound? No noted wound(s)  Redness on groin  Redness on Buttocks  Redness on LEEANN Knees  Redness on Mid back       Aniceto Prevention initiated by RN: No  Wound Care Orders initiated by RN: No    Pressure Injury (Stage 3,4, Unstageable, DTI, NWPT, and Complex wounds) if present, place Wound referral order by RN under : No    New Ostomies, if present place, Ostomy referral order under : No     Nurse 1 eSignature: Electronically signed by Nallely Zhu RN on 11/17/24 at 5:18 AM EST    **SHARE this note so that the co-signing nurse can place an eSignature**    Nurse 2 eSignature: {Esignature:[id number]}

## 2024-11-17 NOTE — ED PROVIDER NOTES
space narrowing C6-C7.   2. Mild reversal of curvature.   3. No central canal or foraminal stenosis   4. Chronic sinusitis      Electronically signed by Dom Min MD      CT HEAD WO CONTRAST   Final Result   1. Stable atrophic changes   2. Periventricular microangiopathic ischemic changes, chronic small vessel disease   3. No evidence of acute intracranial hemorrhage   4. Chronic right maxillary sinusitis      Electronically signed by Dom Min MD          LABS:   Results for orders placed or performed during the hospital encounter of 11/16/24   CBC with Auto Differential   Result Value Ref Range    WBC 9.0 4.0 - 11.0 K/uL    RBC 3.79 (L) 4.00 - 5.20 M/uL    Hemoglobin 12.2 12.0 - 16.0 g/dL    Hematocrit 35.9 (L) 36.0 - 48.0 %    MCV 94.8 80.0 - 100.0 fL    MCH 32.2 26.0 - 34.0 pg    MCHC 34.0 31.0 - 36.0 g/dL    RDW 13.1 12.4 - 15.4 %    Platelets 339 135 - 450 K/uL    MPV 8.5 5.0 - 10.5 fL    Neutrophils % 72.6 %    Lymphocytes % 13.1 %    Monocytes % 8.1 %    Eosinophils % 6.0 %    Basophils % 0.2 %    Neutrophils Absolute 6.5 1.7 - 7.7 K/uL    Lymphocytes Absolute 1.2 1.0 - 5.1 K/uL    Monocytes Absolute 0.7 0.0 - 1.3 K/uL    Eosinophils Absolute 0.5 0.0 - 0.6 K/uL    Basophils Absolute 0.0 0.0 - 0.2 K/uL   CMP w/ Reflex to MG   Result Value Ref Range    Sodium 137 136 - 145 mmol/L    Potassium reflex Magnesium 3.8 3.5 - 5.1 mmol/L    Chloride 100 99 - 110 mmol/L    CO2 24 21 - 32 mmol/L    Anion Gap 13 3 - 16    Glucose 110 (H) 70 - 99 mg/dL    BUN 17 7 - 20 mg/dL    Creatinine 0.7 0.6 - 1.2 mg/dL    Est, Glom Filt Rate 88 >60    Calcium 8.6 8.3 - 10.6 mg/dL    Total Protein 7.4 6.4 - 8.2 g/dL    Albumin 4.2 3.4 - 5.0 g/dL    Albumin/Globulin Ratio 1.3 1.1 - 2.2    Total Bilirubin 0.6 0.0 - 1.0 mg/dL    Alkaline Phosphatase 55 40 - 129 U/L    ALT 30 10 - 40 U/L    AST 60 (H) 15 - 37 U/L   CK   Result Value Ref Range    Total CK 1,432 (H) 26 - 192 U/L     EKG   Interpreted in conjunction with  emergencydepartment physician Dr. Jacobs.  Rhythm: normal sinus   Rate: normal  Axis: normal  Ectopy: none  Conduction: normal  ST Segments: No acute ST elevations  T Waves: No significant T wave abnormalities  Clinical Impression: Sinus rhythm  Comparison: Similar appearing EKG to EKG from 9/2022    ED BEDSIDE ULTRASOUND:  No results found.    RECENT VITALS:  BP: (!) 125/51, Temp: 97.7 °F (36.5 °C), Pulse: 81,Respirations: 15, SpO2: 94 %     Procedures     No procedures performed    ED Course     Nursing Notes, Past Medical Hx, Past Surgical Hx, Social Hx, Allergies, and Family Hx were reviewed.         The patient was given the following medications:  Orders Placed This Encounter   Medications    iopamidol (ISOVUE-370) 76 % injection 75 mL    sodium chloride 0.9 % bolus 1,000 mL    sodium chloride flush 0.9 % injection 5-40 mL    sodium chloride flush 0.9 % injection 5-40 mL    0.9 % sodium chloride infusion    OR Linked Order Group     potassium chloride (KLOR-CON M) extended release tablet 40 mEq     potassium bicarb-citric acid (EFFER-K) effervescent tablet 40 mEq     potassium chloride 10 mEq/100 mL IVPB (Peripheral Line)    magnesium sulfate 2000 mg in 50 mL IVPB premix    OR Linked Order Group     ondansetron (ZOFRAN-ODT) disintegrating tablet 4 mg     ondansetron (ZOFRAN) injection 4 mg    polyethylene glycol (GLYCOLAX) packet 17 g    OR Linked Order Group     acetaminophen (TYLENOL) tablet 650 mg     acetaminophen (TYLENOL) suppository 650 mg    0.9 % sodium chloride infusion    OR Linked Order Group     HYDROmorphone (DILAUDID) injection 0.25 mg     HYDROmorphone (DILAUDID) injection 0.5 mg    OR Linked Order Group     oxyCODONE (ROXICODONE) immediate release tablet 5 mg     oxyCODONE (ROXICODONE) immediate release tablet 10 mg       CONSULTS:  None    Review of Systems     Review of Systems  See HPI    Past Medical, Surgical, Family, and Social History     She has a past medical history of Acute cystitis,

## 2024-11-17 NOTE — ED NOTES
How does patient ambulate?   []Low Fall Risk (ambulates by themselves without support)  []Stand by assist   []Contact Guard   []Front wheel walker  []Wheelchair   []Steady  []Bed bound  []History of Lower Extremity Amputation  [x]Unknown, did not assess in the emergency department   How does patient take pills?  []Whole with Water  []Crushed in applesauce  []Crushed in pudding  []Other  [x]Unknown no oral medications were given in the ED  Is patient alert?   [x]Alert  []Drowsy but responds to voice  []Doesn't respond to voice but responds to painful stimuli  []Unresponsive  Is patient oriented?   [x]To person  [x]To place  [x]To time  [x]To situation  []Confused  []Agitated  [x]Follows commands  If patient is disoriented or from a Skill Nursing Facility has family been notified of admission?   []Yes   []No  Patient belongings?   []Cell phone  []Wallet   []Dentures  [x]Clothing  Any specific patient or family belongings/needs/dynamics?   Disoriented at times, easily forgetful,   Miscellaneous comments/pending orders?      If there are any additional questions please reach out to the Emergency Department.           Tootie Landry RN  11/17/24 0223

## 2024-11-17 NOTE — ED PROVIDER NOTES
ED Attending Attestation Note     Date of evaluation: 11/16/2024    I have discussed the case with the resident. I have personally performed a history, physical exam, and my own medical decision making. I have reviewed the note and agree with the findings and plan.  I reviewed the EKG and agree with the residents interpretation.    INITIAL VITALS: BP: (!) 158/40, Temp: 97.7 °F (36.5 °C), Pulse: 81, Respirations: 15,         MDM:  My assessment reveals a 78-year-old female with past medical history of obesity, IBS, frequent falls presenting after a fall.  She fell out of her bed earlier this morning and laid on the ground all day.  On exam, she does not have any evidence of external trauma.  Her workup is remarkable for rhabdo.  Patient will be admitted for IV hydration in the setting of her rhabdo and PT/OT in the setting of her multiple falls         Kt Jacobs MD  11/17/24 0207

## 2024-11-17 NOTE — PROGRESS NOTES
Patient not having much output since beginning of shift. Bladder scan yielded 394 ml retaining in bladder. MD notified and order placed for one time straight cath. Straight cath complete without issue with second RN at bedside. Catheterization yielded 400 ml of dayton colored urine. UA sent to lab per order. Patient resting comfortably at this time. Will continue to monitor.

## 2024-11-17 NOTE — CONSULTS
Clinical Pharmacy Consult Note  Medication History     Admit Date: 11/16/2024    Pharmacy consulted to verify home medication list by Dr. Norris Rao MD.    List of current medications patient is taking is complete. Home Medication list in EPIC updated to reflect changes noted below.    Source of information: Patient, dispense history, dispensing pharmacy ([facility])    Patient's home pharmacy: [facility] [id number] [place] - P [phone] - F [phone]      Changes made to medication list:     Medications removed:   None     Medications added:   All medications added     Medication doses adjusted:   None     Other notes:   Patient unable to recall medication names, verified med list with dispensing pharmacy  Patient reports last taking all home meds Wednesday 11/13/2024  Albuterol 108 mcg/act inhaler - patent reports not using, pharmacy reports dispensing one to her 10/25/2024     Current Outpatient Medications   Medication Instructions    acetaminophen (TYLENOL) 650 mg, Oral, EVERY 6 HOURS PRN    albuterol sulfate HFA (VENTOLIN HFA) 108 (90 Base) MCG/ACT inhaler 2 puffs, Inhalation, EVERY 6 HOURS PRN    amLODIPine (NORVASC) 5 mg, Oral, DAILY    buPROPion (WELLBUTRIN XL) 150 mg, Oral, EVERY MORNING    famotidine (PEPCID) 20 mg, Oral, 2 TIMES DAILY PRN    FLUoxetine (PROZAC) 40 mg, Oral, DAILY    metFORMIN (GLUCOPHAGE-XR) 500 mg, Oral, DAILY WITH BREAKFAST    vitamin D (CHOLECALCIFEROL) 2,000 Units, Oral, DAILY       Thank you for consulting pharmacy,    Ana Tapia, PharmD Candidate

## 2024-11-18 VITALS
HEART RATE: 73 BPM | BODY MASS INDEX: 31.18 KG/M2 | DIASTOLIC BLOOD PRESSURE: 61 MMHG | RESPIRATION RATE: 17 BRPM | OXYGEN SATURATION: 96 % | HEIGHT: 65 IN | SYSTOLIC BLOOD PRESSURE: 141 MMHG | WEIGHT: 187.17 LBS | TEMPERATURE: 97.9 F

## 2024-11-18 LAB
ANION GAP SERPL CALCULATED.3IONS-SCNC: 7 MMOL/L (ref 3–16)
BUN SERPL-MCNC: 15 MG/DL (ref 7–20)
CALCIUM SERPL-MCNC: 8.1 MG/DL (ref 8.3–10.6)
CHLORIDE SERPL-SCNC: 106 MMOL/L (ref 99–110)
CO2 SERPL-SCNC: 24 MMOL/L (ref 21–32)
CREAT SERPL-MCNC: 0.6 MG/DL (ref 0.6–1.2)
EKG ATRIAL RATE: 81 BPM
EKG DIAGNOSIS: NORMAL
EKG P AXIS: 60 DEGREES
EKG P-R INTERVAL: 144 MS
EKG Q-T INTERVAL: 398 MS
EKG QRS DURATION: 74 MS
EKG QTC CALCULATION (BAZETT): 462 MS
EKG R AXIS: 19 DEGREES
EKG T AXIS: 67 DEGREES
EKG VENTRICULAR RATE: 81 BPM
GFR SERPLBLD CREATININE-BSD FMLA CKD-EPI: >90 ML/MIN/{1.73_M2}
GLUCOSE SERPL-MCNC: 130 MG/DL (ref 70–99)
MAGNESIUM SERPL-MCNC: 1.85 MG/DL (ref 1.8–2.4)
POTASSIUM SERPL-SCNC: 3.5 MMOL/L (ref 3.5–5.1)
SODIUM SERPL-SCNC: 137 MMOL/L (ref 136–145)

## 2024-11-18 PROCEDURE — 2580000003 HC RX 258: Performed by: INTERNAL MEDICINE

## 2024-11-18 PROCEDURE — G0378 HOSPITAL OBSERVATION PER HR: HCPCS

## 2024-11-18 PROCEDURE — 97530 THERAPEUTIC ACTIVITIES: CPT

## 2024-11-18 PROCEDURE — 93010 ELECTROCARDIOGRAM REPORT: CPT | Performed by: INTERNAL MEDICINE

## 2024-11-18 PROCEDURE — 83735 ASSAY OF MAGNESIUM: CPT

## 2024-11-18 PROCEDURE — 97166 OT EVAL MOD COMPLEX 45 MIN: CPT

## 2024-11-18 PROCEDURE — 80048 BASIC METABOLIC PNL TOTAL CA: CPT

## 2024-11-18 PROCEDURE — 36415 COLL VENOUS BLD VENIPUNCTURE: CPT

## 2024-11-18 PROCEDURE — 97535 SELF CARE MNGMENT TRAINING: CPT

## 2024-11-18 PROCEDURE — 97162 PT EVAL MOD COMPLEX 30 MIN: CPT

## 2024-11-18 PROCEDURE — 97116 GAIT TRAINING THERAPY: CPT

## 2024-11-18 RX ADMIN — SODIUM CHLORIDE, PRESERVATIVE FREE 25 ML: 5 INJECTION INTRAVENOUS at 10:16

## 2024-11-18 ASSESSMENT — PAIN SCALES - GENERAL: PAINLEVEL_OUTOF10: 0

## 2024-11-18 NOTE — PROGRESS NOTES
Spiritual Health History and Assessment/Progress Note  [facility]    Initial Encounter,  ,  ,      Name: Dalila Eddy MRN: [id number]    Age: 78 y.o.     Sex: female   Language: English   Mu-ism: Episcopal   Fall (on) (from) other stairs and steps, initial encounter     Date: 11/18/2024            Total Time Calculated: (P) 15 min              Spiritual Assessment began in [address] TELEMETRY        Referral/Consult From: (P) Nurse (Nallely Zhu RN)   Encounter Overview/Reason: Initial Encounter       Sania, Belief, Meaning:   Patient identifies as spiritual  Family/Friends No family/friends present      Importance and Influence:  Patient has spiritual/personal beliefs that influence decisions regarding their health  Family/Friends No family/friends present    Community:  Patient is connected with a spiritual community  Family/Friends No family/friends present    Assessment and Plan of Care:     Patient Interventions include: Explored spiritual coping/struggle/distress and Affirmed coping skills/support systems  Family/Friends Interventions include: No family/friends present    Patient Plan of Care: No spiritual needs identified for follow-up and Spiritual Care available upon further referral  Family/Friends Plan of Care: No family/friends present    Electronically signed by RADHA Flores on 11/18/2024 at 2:18 PM

## 2024-11-18 NOTE — PLAN OF CARE
Problem: Spiritual Struggle  Goal: Growing sense of peace/hope  Outcome: Progressing     Problem: Safety - Adult  Goal: Free from fall injury  Outcome: Progressing     Problem: Chronic Conditions and Co-morbidities  Goal: Patient's chronic conditions and co-morbidity symptoms are monitored and maintained or improved  Outcome: Progressing  Flowsheets (Taken 11/17/2024 1936)  Care Plan - Patient's Chronic Conditions and Co-Morbidity Symptoms are Monitored and Maintained or Improved: Collaborate with multidisciplinary team to address chronic and comorbid conditions and prevent exacerbation or deterioration     Problem: Discharge Planning  Goal: Discharge to home or other facility with appropriate resources  Recent Flowsheet Documentation  Taken 11/17/2024 1936 by Nallely Zhu, RN  Discharge to home or other facility with appropriate resources: Arrange for needed discharge resources and transportation as appropriate

## 2024-11-18 NOTE — PROGRESS NOTES
Discharge order received. Patient being discharged home. Paperwork reviewed with patient, pt verbalizes understanding and denies questions. IV and tele removed. All belongings sent with patient.

## 2024-11-18 NOTE — DISCHARGE INSTRUCTIONS
We are discharging you to home with home health care. Please continue taking all of your medications as previously prescribed, we have not made any changes to these.     Please return to the hospital with any new or worsening symptoms.

## 2024-11-18 NOTE — PLAN OF CARE
Problem: Discharge Planning  Goal: Discharge to home or other facility with appropriate resources  Outcome: Progressing  Note: Pt to discharge home today.     Problem: Safety - Adult  Goal: Free from fall injury  Outcome: Progressing  Note: Pt up to chair, chair alarm on, call light within reach, gripper socks applied, pt educated to to use call light before self transferring

## 2024-11-18 NOTE — PROGRESS NOTES
Occupational Therapy  Facility/Department: [address]  Occupational Therapy Initial Assessment and Treatment      Name: Dalila Eddy  : 1945  MRN: [id number]  Date of Service: 2024    Discharge Recommendations:  Home with Home health OT  OT Equipment Recommendations  Equipment Needed: Yes  Other: discussed recommendation for toileting aide vs. bidet for toilet       Patient Diagnosis(es): The primary encounter diagnosis was Elevated CK. A diagnosis of Multiple falls was also pertinent to this visit.      Treatment Diagnosis: impaired ADLs/transfers      Assessment  Performance deficits / Impairments: Decreased functional mobility ;Decreased ADL status  Assessment: Presenting from home alone after 2 falls. Pt reports no other recent falls - did not have her life alert button on at the time. At baseline, pt is independent w/ ADLs, functional transfers. Receives some assistance for IADLs from home health. Pt is currently requiring CGA for functional transfers/mobility using RW and Gianni for LB dressing. Does report some difficulty w/ posterior hygiene at home due to poor reach - discussed options for bidet vs. toileting aide. Pt appears to be functioning near her baseline. Will benefit from HHOT at discharge. Continue per POC during admission.  Treatment Diagnosis: impaired ADLs/transfers  Prognosis: Good  Decision Making: Medium Complexity  REQUIRES OT FOLLOW-UP: Yes  Activity Tolerance  Activity Tolerance: Patient Tolerated treatment well     Plan  Occupational Therapy Plan  Times Per Week: 2-5  Current Treatment Recommendations: Strengthening, Balance training, Safety education & training, Functional mobility training, Self-Care / ADL    Restrictions  Position Activity Restriction  Other position/activity restrictions: Up as tolerated    Subjective  General  Chart Reviewed: Yes  Additional Pertinent Hx: 78 y.o. F who presents status post multiple falls with tailbone pain.                      Hospital Course: All imaging negative.                      PMH: hypertension, prediabetes, frequent falls, stress incontinence, IBS.  Family / Caregiver Present: No  Referring Practitioner: Norris Rao MD  Diagnosis: Elevated CK, Multiple Falls    Subjective  Subjective: In bed on arrival. Noted that pt's urine smelled strong - reports she has been in the same wet brief for awhile now. \"That would be nice.\" (to walk to bathroom to toilet)    Pt reports no pain.    Social/Functional History  Social/Functional History  Lives With: Alone  Type of Home: Senior housing apartment  Home Layout: One level  Home Access: Elevator  Bathroom Shower/Tub: Walk-in shower  Bathroom Toilet: Standard (sink for leverage)  Bathroom Equipment: Shower chair, Grab bars in shower, Hand-held shower, Grab bars around toilet  Home Equipment: Alert button, Walker - Rolling, Cane, Wheelchair - Manual, Hospital bed  ADL Assistance: Independent  Homemaking Assistance: Needs assistance ([facility] 2x week for laundry and cleaning, microwave meals)  Ambulation Assistance: Independent (uses rolling walker PRN inside home, takes rolling walker outside home, uses the scooter at the grocery store.)  Transfer Assistance: Independent  Active : Yes    Objective              Observation/Palpation  Observation: 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    Safety Devices  Type of Devices: Nurse notified;Chair alarm in place;Call light within reach;Left in chair (pressure reduction pillow in place for pressure relief)    Balance  Sitting: Intact  Standing:  (SB/CGA)    Toilet Transfers  Toilet - Technique: Ambulating (using RW)  Equipment Used: Standard toilet (w/ bar)  Toilet Transfer: Contact guard assistance    AROM: Within functional limits  Strength: Within functional limits  Coordination:  (congenital deformities B hands (only has one digit), but manages well for ADLs)    ADL  Grooming: Contact guard

## 2024-11-18 NOTE — DISCHARGE INSTR - COC
Continuity of Care Form    Patient Name: Dalila Eddy   :  1945  MRN:  [id number]    Admit date:  2024  Discharge date:  ***    Code Status Order: Full Code   Advance Directives:   Advance Care Flowsheet Documentation             Admitting Physician:  Norris Rao MD  PCP: Debra Dias MD    Discharging Nurse: ***  Discharging Hospital Unit/Room#: 6326/6326-01  Discharging Unit Phone Number: ***    Emergency Contact:   Extended Emergency Contact Information  Primary Emergency Contact: Mary Ann OLIVO  Mobile Phone: [phone]  Relation: Brother/Sister   needed? No    Past Surgical History:  History reviewed. No pertinent surgical history.    Immunization History:   Immunization History   Administered Date(s) Administered    COVID-19, MODERNA BLUE border, Primary or Immunocompromised, (age 12y+), IM, 100 mcg/0.5mL 2021, 2021    COVID-19, MODERNA Booster BLUE border, (age 18y+), IM, 50mcg/0.25mL 10/31/2021    COVID-19, MODERNA, (age 12y+), IM, 50mcg/0.5mL 10/27/2023    COVID-19, PFIZER, (age 12y+), IM, 30mcg/0.3mL 2024       Active Problems:  Patient Active Problem List   Diagnosis Code    Fall (on) (from) other stairs and steps, initial encounter W10.8XXA       Isolation/Infection:   Isolation            No Isolation          Patient Infection Status       None to display            Nurse Assessment:  Last Vital Signs: BP (!) 141/61   Pulse 73   Temp 97.9 °F (36.6 °C) (Oral)   Resp 17   Ht 1.651 m (5' 5\")   Wt 84.9 kg (187 lb 2.7 oz)   SpO2 96%   BMI 31.15 kg/m²     Last documented pain score (0-10 scale): Pain Level: 0  Last Weight:   Wt Readings from Last 1 Encounters:   24 84.9 kg (187 lb 2.7 oz)     Mental Status:  {IP PT MENTAL STATUS:}    IV Access:  { CHRISTOPHER IV ACCESS:137332909}    Nursing Mobility/ADLs:  Walking   {CHP DME ADLs:706092604}  Transfer  {CHP DME ADLs:219966382}  Bathing  {CHP DME ADLs:558403310}  Dressing  {CHP DME    Address:  Phone:  Fax:    Dialysis Facility (if applicable)   Name:  Address:  Dialysis Schedule:  Phone:  Fax:    / signature: {Esignature:[id number]}    PHYSICIAN SECTION    Prognosis: Good    Condition at Discharge: Stable    Rehab Potential (if transferring to Rehab): Good    Recommended Labs or Other Treatments After Discharge: Continue PT/OT outpatient therapy/home health.     Physician Certification: I certify the above information and transfer of Dalila Eddy  is necessary for the continuing treatment of the diagnosis listed and that she requires Home Care for less 30 days.     Update Admission H&P: No change in H&P    PHYSICIAN SIGNATURE:  Electronically signed by Michel Salazar DO on 11/18/24 at 1:57 PM EST

## 2024-11-18 NOTE — CARE COORDINATION
UPDATE:  Transport set up 1700.  [facility] set up.  Patient discharging back home this evening.  Electronically signed by SHAILESH Navas, RN Case Manager on 11/18/2024 at 4:25 PM      Case Management Assessment  Initial Evaluation    Date/Time of Evaluation: 11/18/2024 1:15 PM  Assessment Completed by: Sarah Whitehead    If patient is discharged prior to next notation, then this note serves as note for discharge by case management.    Patient Name: Dalila Eddy                   YOB: 1945  Diagnosis: Elevated CK [R74.8]  Multiple falls [R29.6]  Fall (on) (from) other stairs and steps, initial encounter [W10.8XXA]                   Date / Time: 11/16/2024  8:51 PM    Patient Admission Status: Inpatient   Readmission Risk (Low < 19, Mod (19-27), High > 27): Readmission Risk Score: 9.1    Current PCP: Debra Dias MD  PCP verified by CM? Yes    Chart Reviewed: Yes      History Provided by: Patient  Patient Orientation: Alert and Oriented    Patient Cognition: Alert    Hospitalization in the last 30 days (Readmission):  No    If yes, Readmission Assessment in CM Navigator will be completed.    Advance Directives:      Code Status: Full Code   Patient's Primary Decision Maker is: Legal Next of Kin      Discharge Planning:    Patient lives with: Alone ([facility]) Type of Home: Apartment, Other (Comment) ([facility])  Primary Care Giver: Other (Comment) (Lives at [facility] in [place])  Patient Support Systems include: None   Current Financial resources: Medicare  Current community resources: Other (Comment) ([facility])  Current services prior to admission: Other (Comment) ([facility] twice weekly)            Current DME:              Type of Home Care services:  OT, PT, Meals on Wheels    ADLS  Prior functional level: Assistance with the following:, Mobility (walker at baseline)  Current functional level: Assistance with the following:, Mobility (walker at

## 2024-11-18 NOTE — DISCHARGE SUMMARY
V2.0  Discharge Summary    Name:  Dalila Eddy /Age/Sex: 1945 (78 y.o. female)   Admit Date: 2024  Discharge Date: 24    MRN & CSN:  [id number] & [id number] Encounter Date and Time 24 1:58 PM EST    Attending:  Michel Salazar DO Discharging Provider: Michel Salazar DO       Hospital Course:     Brief HPI: Dalila Eddy is a 78 y.o. female who presented with tailbone pain status post fall. Patient notes that she developed tailbone pain approximately 2 days ago when she was sitting on the toilet. She notes that she was on the toilet for a few hours and was unable to get up so she had to call EMS to assist. She does note that yesterday, she slid on the floor and fell onto her bottom which worsened her pain. She also had to call EMS at that time to assist her in getting up. Patient notes that she fell out of bed earlier this morning and landed on the floor. She laid on the floor all day for several hours until late at night because she was unable to get up and did not have her cell phone available to contact anyone. Her sister who lives in another state grew concerned when she could not get a hold of the patient and called EMS who found her laying on the ground. She denies hitting her head or losing consciousness. She does note that she urinated on herself since she was unable to get up. She denies having any chest pain, abdominal pain. Patient does note some pain in her tailbone region, but denies any numbness or tingling. Patient notes that she is on 12 medications, and does not think that she is on any blood thinners.     Brief Problem Based Course:     Elevated CK  - Patient presented following a fall at home, where she was unable to get up on her own and EMS had to assist in getting her up. Patient's CK level was elevated at 1342 on presentation and patient was admitted for monitoring of this level and her renal function. Patient's CK level did downtrend appropriately and her renal  control, use of a iterative reconstruction techniques, or adjustment of the mA or KV according to the patients size) were used to limit patient radiation dose IV contrast: None. FINDINGS: Cerebellar tonsils: Normal. Alignment: The craniovertebral and cervical spine alignment are normal. Bones: No fracture or destructive osseous process. Neck soft tissues: Normal. Visualized lung apices and mediastinum: Normal. . Skull base: Sphenoid sinus mucosal thickening and right maxillary chronic sinusitis. C6-C7 degenerative disc space narrowing with small posterior osteophyte, annular hyperostosis. Left foraminal stenosis C6-C7. Right foramen is patent Remainder the disc annuli are unremarkable. No central canal or foraminal stenosis at other levels.     1. Degenerative disc space narrowing C6-C7. 2. Mild reversal of curvature. 3. No central canal or foraminal stenosis 4. Chronic sinusitis Electronically signed by Dom Min MD    CT HEAD WO CONTRAST    Result Date: 11/17/2024  EXAM: COMPUTED TOMOGRAPHY OF THE BRAIN Noncontrast. INDICATIONS: Trauma, found down, fall COMPARISON: April 20, 2024 TECHNICAL FACTORS: Multiplanar contiguous spiral axial scanning Skull base to high convexity with multi-reformatted images. CT radiation dose optimization techniques (automated exposure control, use of a iterative reconstruction techniques, or adjustment of the mA or KV according to the patients size) were used to limit patient radiation dose.] FINDINGS: Noncontrast axial images reveal midline ventricles. Ventricular size and cortical sulci are  prominent compatible with atrophic changes. No intra-axial/extra-axial masses or fluid collections. Diffuse periventricular white matter hypodensity consistent with microangiopathic small vessel ischemic changes. No evidence of acute intracranial hemorrhage. Posterior fossa is within normal limits Paranasal sinuses: Atelectatic right maxillary sinus with near complete opacification,

## 2024-11-26 NOTE — PROGRESS NOTES
Physician Progress Note      PATIENT:               BING SAMUELS  CSN #:                  [id number]  :                       1945  ADMIT DATE:       2024 8:51 PM  DISCH DATE:        2024 6:10 PM  RESPONDING  PROVIDER #:        Michel Salazar DO          QUERY TEXT:    Internal Medicine,    Pt admitted with  repeated falls and has rhabdomyolysis documented.  If   possible, please document in progress notes and discharge summary if the   rhabdomyolysis can be further specified as any of the following:    The medical record reflects the following:  Risk Factors:  falls  Clinical Indicators: falls and rhabdomyolysis documented in H&P, dropped in dc   summary and elevated CK is documented  Treatment: labs, imaging, IVF, medical management    Thank you,  YAIRRPALLAVI    Per [email or web address]  Traumatic rhabdomyolysis cause examples: crush syndrome, prolonged   immobilization  Nontraumatic rhabdomyolysis cause examples:  marked exertion, hyperthermia,   metabolic myopathy, drugs or toxins, infections, electrolyte disorders.  Options provided:  -- Traumatic rhabdomyolysis  -- Other - I will add my own diagnosis  -- Disagree - Not applicable / Not valid  -- Disagree - Clinically unable to determine / Unknown  -- Refer to Clinical Documentation Reviewer    PROVIDER RESPONSE TEXT:    Rhabdomyolysis ruled out    Query created by: Danni Sarah on 2024 12:41 PM      Electronically signed by:  Michel Salazar DO 2024 6:12 PM